# Patient Record
(demographics unavailable — no encounter records)

---

## 2025-01-08 NOTE — PHYSICAL EXAM
[3+] : 3+ [Normal Gait and Station] : normal gait and station [Normal muscle strength, symmetry and tone of facial, head and neck musculature] : normal muscle strength, symmetry and tone of facial, head and neck musculature [Normal] : no cervical lymphadenopathy [Exposed Vessel] : left anterior vessel not exposed [Increased Work of Breathing] : no increased work of breathing with use of accessory muscles and retractions

## 2025-01-08 NOTE — DATA REVIEWED
[FreeTextEntry1] : 10/19/24, personally reviewed: Mild obstruction ROBYN , moderate ROBYN in REM sleep (AHI 3.4, REM AHI 8.7), lowest O2 92%

## 2025-01-08 NOTE — ASSESSMENT
[FreeTextEntry1] : We discussed watchful waiting and proceeding to adenotonsillectomy given mild ROBYN, moderate REM ROBYN.  We discussed the risks, benefits, alternatives, and personnel involved in adenotonsillectomy. The risks include, but are not limited to, post-tonsillectomy bleeding that can range from minimal to life threatening, dehydration, throat pain, and speech changes. Expectations of one week out of school, two weeks out of sports/P.E., need for encouragement of fluid intake, limitation of diet to restrict hard/crunchy foods were discussed. If bleeding were to occur post-operatively parent is to bring the child immediately to the nearest emergency department.   Mom would like to continue expectant management. If symptoms worsening will return to discuss surgery, if desired.

## 2025-01-08 NOTE — REASON FOR VISIT
[Initial Evaluation] : an initial evaluation for [Sleep Apnea/ Snoring] : sleep apnea/ snoring [Patient] : patient [Mother] : mother [Language Line ] : provided by [employer]   [Interpreters_IDNumber] : [id number] [Interpreters_FullName] : Emmanuel [TWNoteComboBox1] : Lao

## 2025-01-08 NOTE — CONSULT LETTER
[Dear  ___] : Dear  [unfilled], [Courtesy Letter:] : I had the pleasure of seeing your patient, [unfilled], in my office today. [Please see my note below.] : Please see my note below. [Sincerely,] : Sincerely, [DrShala  ___] : Dr. LAMAR [FreeTextEntry3] : Rena Blankenship M.[place] [address] Tel [phone] Fax [phone]

## 2025-01-08 NOTE — HISTORY OF PRESENT ILLNESS
[No Personal or Family History of Easy Bruising, Bleeding, or Issues with General Anesthesia] : No Personal or Family History of easy bruising, bleeding, or issues with general anesthesia [de-identified] : Jeanine is a 10 year old female here for evaluation of snoring and sleep apnea.  Here with mom who provides history via .   Snoring nightly for many years.  Significant daytime fatigue. Also with difficulty concentrating and has a diagnosis of ADHD.  Wakes up frequently at night.  Sleep study in 2023 with AHI 1.1. Sleep study 2024 with AHI 3.4.   Mom reports last ear infection was over a year ago.  No hearing or speech concerns.  No other otolaryngology concerns.